# Patient Record
Sex: MALE | Race: WHITE
[De-identification: names, ages, dates, MRNs, and addresses within clinical notes are randomized per-mention and may not be internally consistent; named-entity substitution may affect disease eponyms.]

---

## 2018-07-26 ENCOUNTER — HOSPITAL ENCOUNTER (EMERGENCY)
Dept: HOSPITAL 45 - C.EDB | Age: 33
Discharge: HOME | End: 2018-07-26
Payer: COMMERCIAL

## 2018-07-26 VITALS
WEIGHT: 216.71 LBS | BODY MASS INDEX: 32.1 KG/M2 | WEIGHT: 216.71 LBS | HEIGHT: 69.02 IN | HEIGHT: 69.02 IN | BODY MASS INDEX: 32.1 KG/M2

## 2018-07-26 VITALS — TEMPERATURE: 98.42 F

## 2018-07-26 VITALS — OXYGEN SATURATION: 97 % | SYSTOLIC BLOOD PRESSURE: 146 MMHG | HEART RATE: 69 BPM | DIASTOLIC BLOOD PRESSURE: 87 MMHG

## 2018-07-26 DIAGNOSIS — S06.0X0A: Primary | ICD-10-CM

## 2018-07-26 DIAGNOSIS — F17.220: ICD-10-CM

## 2018-07-26 DIAGNOSIS — M54.2: ICD-10-CM
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
## 2018-07-26 NOTE — DIAGNOSTIC IMAGING REPORT
CT HEAD WITHOUT CONTRAST (CT)



CLINICAL HISTORY: Head trauma. Head pressure. Visual disturbances.    



COMPARISON STUDY:  No previous studies for comparison.



TECHNIQUE:  Axial CT of the brain is performed from the vertex to the skull

base. IV contrast was not administered for this examination. A dose lowering

technique was utilized adhering to the principles of ALARA.

 



CT DOSE:    



FINDINGS:



No intra or extra-axial mass lesions are visualized. There is no CT evidence of

acute cortical infarction. There is no evidence of midline shift. There is no

acute  hemorrhage. No calvarial fractures are visualized. 

   

There is no evidence of pathologic ventricular dilatation.

There is no evidence of acute sinusitis



IMPRESSION: No acute intracranial findings







Electronically signed by:  Robbie Morrissey M.D.

7/26/2018 7:23 PM



Dictated Date/Time:  7/26/2018 7:21 PM

## 2018-07-26 NOTE — EMERGENCY ROOM VISIT NOTE
ED Visit Note


First contact with patient:  18:59


CHIEF COMPLAINT:  Head injury, severe neck pain








HISTORY OF PRESENT ILLNESS: This 40-year-old male patient presented to the 

emergency department, ambulatory, approximately 6 hours after receiving a head 

injury at work.  The patient states he was disassembling a bad when a heavy 

headboard fell backwards onto the posterior aspect of his head and neck.  The 

patient is uncertain how heavy this was, but states it fell from a leaning 

position.  There was no brief loss of consciousness.  There has been one 

episode of vomiting. The patient complains of severe neck pain and head 

pressure behind his eyes.  Patient states the neck pain radiates into his 

shoulder blades.  He does also complain of nausea and some initial tingling in 

his hands.  The patient denies visual disturbances, loss of consciousness, 

ongoing paresthesias, confusion, dizziness, or other concerning symptoms.  The 

headache has been constant.  The patient has taken no medications for the pain.

  The patient rates the pain as 7/10 and throbbing.  The patient denies bowel 

or bladder dysfunction.  The patient denies any other injuries.  The patient 

was seen by urgent care and sent to the emergency department for evaluation due 

to the symptoms.








REVIEW OF SYSTEMS:      A 10 system review of systems was performed with 

positives and pertinent negatives listed in the history of present illness. All 

other systems were reviewed and are negative. 








ALLERGIES: None








MEDICATIONS: None








PMH: None








SOCIAL HISTORY: The patient lives locally with family.  He admits to using 

chewing tobacco.  He denies drug, alcohol use.








PHYSICAL EXAM: Vital Signs: Reviewed Nurse's notes, vital signs stable.  GENERAL

: This is a 32-year-old white male, in no acute distress, well-developed, well-

nourished.  NEURO: The patient is alert, oriented to person place and time, and 

coherent.  Normal mini mental status exam.  Negative Romberg and pronator 

drift.  Cerebellar function intact.  HEAD: Normocephalic, atraumatic.  EYES: 

Pupils are equal round and reactive to light and accommodation.  EOMs are full 

and optic discs and fundi are normal.  There is no swelling or discoloration of 

the tissue surrounding the eyes.  EARS: Negative manuel sign.  External 

auditory canals clear without blood.  No hemotympanum.  NOSE: Patent without 

tenderness.  No septal hematoma.  FACE: No facial bone tenderness.   NECK: 

Supple.  There is significant cervical spine tenderness.  The patient does have 

tenderness with movement of the neck.  There is tenderness of the paraspinous 

muscles bilaterally.  There is difficulty with movement of the bilateral upper 

extremities due to stiffness and pain.





RADIOLOGY:


CT HEAD WITHOUT CONTRAST (CT)





CLINICAL HISTORY: Head trauma. Head pressure. Visual disturbances.    





COMPARISON STUDY:  No previous studies for comparison.





TECHNIQUE:  Axial CT of the brain is performed from the vertex to the skull


base. IV contrast was not administered for this examination. A dose lowering


technique was utilized adhering to the principles of ALARA.


 





CT DOSE:    





FINDINGS:





No intra or extra-axial mass lesions are visualized. There is no CT evidence of


acute cortical infarction. There is no evidence of midline shift. There is no


acute  hemorrhage. No calvarial fractures are visualized. 


   


There is no evidence of pathologic ventricular dilatation.


There is no evidence of acute sinusitis





IMPRESSION: No acute intracranial findings











Electronically signed by:  Robbie Morrissey M.D.


7/26/2018 7:23 PM





Dictated Date/Time:  7/26/2018 7:21 PM





CT OF THE CERVICAL SPINE





CLINICAL HISTORY: Neck pain status post trauma    





COMPARISON STUDY:  No previous studies for comparison. 





CT DOSE: 980.02 mGy.cm





TECHNIQUE: CT scan of the cervical spine was performed from the skull base to


the thoracic inlet. Images are reviewed in the axial, sagittal, and coronal


planes. IV contrast was not administered for this examination.  A dose lowering


technique was utilized adhering to the principles of ALARA.








FINDINGS:





The visualized portions of the lung apices reveal no evidence of pneumothorax.





The prevertebral soft tissues are normal.  No fractures or subluxations are


visualized.





There is a slight reversal of the normal cervical lordosis. This may be


secondary to muscle spasm or positioning.








IMPRESSION: No evidence of acute fracture or traumatic subluxation.











Electronically signed by:  Robbie Morrissey M.D.


7/26/2018 7:24 PM





Dictated Date/Time:  7/26/2018 7:23 PM








ED COURSE:  I  examined the patient.  He was given Tylenol for pain initially.  

CT scans performed and reviewed by myself and radiologist as above.  Suspect 

the symptoms are related to muscle spasms and a mild concussion.  The patient 

was encouraged to use anti-inflammatories and muscle relaxers for his symptoms.

  He was given a dose of Flexeril and a home pack for Naprosyn here in the 

emergency department.  Prescriptions are sent to the pharmacy.  The patient was 

given a note for work until Monday, but was encouraged to follow-up with his 

PCP or employee health provider especially for any ongoing symptoms.  All 

questions answered to the patient's satisfaction.  Discharge instructions 

reviewed.  The patient was discharged home in good condition ambulatory.





I attest that I have personally reviewed the patient's current medication list. 


Patient was found to have normal blood pressure on screening and does not 

require follow-up. 





Differential diagnosis includes closed head injury, concussion, intracranial 

hemorrhage, skull fracture, contusion, headache, infection, malignancy, and 

others.








DIAGNOSIS: Closed head injury, concussion, neck pain





The chart was completed utilizing Dragon Speech voice recognition software. 

Grammatical errors, random word insertions, pronoun errors, and incomplete 

sentences are an occasional consequence of this system due to software 

limitations, ambient noise, and hardware issues. Any formal questions or 

concerns about the content, text, or information contained within the body of 

this dictation should be directly addressed to the provider for clarification.


Current/Historical Medications


Scheduled PRN


Cyclobenzaprine Hcl (Flexeril), 5-10 MG PO TID PRN for Muscle Spasms


Naproxen (Naprosyn), 500 MG PO BID PRN for Pain





Allergies


Uncoded Allergies:  


     N (Allergy, Unknown, 2/13/03)


     NKDA (Allergy, Unknown, 2/13/03)





Vital Signs











  Date Time  Temp Pulse Resp B/P (MAP) Pulse Ox O2 Delivery O2 Flow Rate FiO2


 


7/26/18 20:23  69 19 146/87 97   


 


7/26/18 18:56   16     


 


7/26/18 18:54 36.9 69 18 156/104 98 Room Air  











Medications Administered











 Medications


  (Trade)  Dose


 Ordered  Sig/Temitope


 Route  Start Time


 Stop Time Status Last Admin


Dose Admin


 


 Acetaminophen


  (Tylenol Tab)  1,000 mg  NOW  STAT


 PO  7/26/18 19:07


 7/26/18 19:09 DC 7/26/18 19:32


1,000 MG


 


 Cyclobenzaprine


 HCl


  (Flexeril Tab)  10 mg  NOW  STAT


 PO  7/26/18 20:01


 7/26/18 20:02 DC 7/26/18 20:18


10 MG


 


 Cyclobenzaprine


 HCl


  (FLEXERIL 10MG


 Home Pack)  1 homepack  UD  STAT


 PO  7/26/18 20:01


 7/26/18 20:02 DC 7/26/18 20:01


1 HOMEPACK


 


 Naproxen


  (Naproxen 250MG


 Home Pack)  1 homepack  UD  STAT


 PO  7/26/18 20:01


 7/26/18 20:02 DC 7/26/18 20:01


1 HOMEPACK











Departure Information


Impression





 Primary Impression:  


 Closed head injury


 Additional Impressions:  


 Concussion


 Neck pain





Dispostion


Home / Self-Care





Condition


GOOD





Prescriptions





Naproxen (Naprosyn) 500 Mg Tab


500 MG PO BID Y for Pain, #60 TAB


   Prov: Constance Barrera PA-C         7/26/18 


Cyclobenzaprine Hcl (FLEXERIL) 5 Mg Tab


5-10 MG PO TID Y for Muscle Spasms, #30 TAB


   PRN


   Prov: Constance Barrera PA-C         7/26/18





Referrals


No Doctor, Assigned (PCP)





Patient Instructions


ED Concussion, ED Head Injury Closed, ED Neck Back Pain General, [facility]





Additional Instructions





You have been treated in the Emergency Department for a Closed Head Injury.  

You have received pain medicine in the emergency department which impairs your 

ability to operate a vehicle.





CT Scan of your head/brain and neck demonstrated no acute bleeding or other 

abnormalities. This does not completely rule out the risk for future damage to 

the brain.





You have been prescribed Flexeril (cyclobenzaprine) 1-2 tabs orally, three 

times per day. Do NOT exceed 30 mg (6 tabs) per day. Take your first dose at 

bedtime as it can make you drowsy. Always take all medications as prescribed.





For pain control, you can use the following over-the-counter medicines (if >11 yo):


You have been prescribed naproxen 500 mg twice daily to be used for pain.  Take 

with food.  Avoid using more than 1000mg in a 24 hour period.  Do not use 

1000mg per day for more than three consecutive days without physician 

direction.  Prolonged inappropriate use can lead to stomach upset or ulcers. 


(AND/OR)


Acetaminophen(Tylenol) may be used for fever or pain.  Use 1000mg every six 

hours as needed.  Avoid using more than 3000mg in a 24 hour period.  





You should relax in a quiet, dark place for the rest of the day. Avoid any 

possible triggers including: cigarette smoke, caffeine, nicotine, chocolate, 

wine, beer, loud noises or music, or bright lights. 





You should schedule a follow-up appointment in 2-3 days with your Primary Care 

Provider or established Neurologist for further evaluation and treatment of 

your Headache.





Return to the Emergency Department if your current symptoms worsen despite 

treatment course outlined above, or if you develop any of the following symptoms

: intractable pain despite aforementioned treatment course, visual disturbances

, loss of vision, unilateral weakness or facial drooping, slurring of speech, 

loss of coordination, or loss of consciousness.





Problem Qualifiers








 Primary Impression:  


 Closed head injury


 Encounter type:  initial encounter  Qualified Codes:  S09.90XA - Unspecified 

injury of head, initial encounter


 Additional Impressions:  


 Concussion


 Encounter type:  initial encounter  Loss of consciousness presence/duration:  

without LOC  Qualified Codes:  S06.0X0A - Concussion without loss of 

consciousness, initial encounter

## 2018-07-26 NOTE — DIAGNOSTIC IMAGING REPORT
CT OF THE CERVICAL SPINE



CLINICAL HISTORY: Neck pain status post trauma    



COMPARISON STUDY:  No previous studies for comparison. 



CT DOSE: 980.02 mGy.cm



TECHNIQUE: CT scan of the cervical spine was performed from the skull base to

the thoracic inlet. Images are reviewed in the axial, sagittal, and coronal

planes. IV contrast was not administered for this examination.  A dose lowering

technique was utilized adhering to the principles of ALARA.





FINDINGS:



The visualized portions of the lung apices reveal no evidence of pneumothorax.



The prevertebral soft tissues are normal.  No fractures or subluxations are

visualized.



There is a slight reversal of the normal cervical lordosis. This may be

secondary to muscle spasm or positioning.





IMPRESSION: No evidence of acute fracture or traumatic subluxation.







Electronically signed by:  Robbie Morrissey M.D.

7/26/2018 7:24 PM



Dictated Date/Time:  7/26/2018 7:23 PM